# Patient Record
Sex: FEMALE | URBAN - METROPOLITAN AREA
[De-identification: names, ages, dates, MRNs, and addresses within clinical notes are randomized per-mention and may not be internally consistent; named-entity substitution may affect disease eponyms.]

---

## 2020-10-30 NOTE — IMPRESSION/PLAN
Impression: Partial CRVO, OD. Status: Symptomatic.
s/p Avastin x 9 last 08/12/2020 (consented 02/27/2020) Plan: Exam and OCT continue to reveal extrafoveal CME OD (280 microns, from 404 microns). An IVFA from 10/30/2020 demonstrated no NVE OD. Fundus Photos from 10/30/2020 demonstrated tortuous vessels. Recommend Avastin. R/B/A discussed with patient. The risks of Avastin were discussed, including off-label use and compounding pharmacy risks, and the patient elects to proceed with Avastin. After 2% Subconjunctival anesthesia & Betadine prep, 1.25mg of Avastin was injected in the eye. Cold compress and Tylenol suggested for pain if needed. Nehemias Guan Return in 7-8 weeks for OCT OU, re eval of CRVO

## 2020-12-11 NOTE — IMPRESSION/PLAN
Impression: Partial CRVO, OD. Status: Symptomatic.
s/p Avastin x 10 last 10/30/2020(consented 02/27/2020) Plan: Exam and OCT continue to reveal extrafoveal CME OD (286 microns, from 404 microns). An IVFA from 10/30/2020 demonstrated no NVE OD. Fundus Photos from 10/30/2020 demonstrated tortuous vessels. Recommend Avastin. R/B/A discussed with patient. The risks of Avastin were discussed, including off-label use and compounding pharmacy risks, and the patient elects to proceed with Avastin. After 2% Subconjunctival anesthesia & Betadine prep, 1.25mg of Avastin was injected in the eye. Cold compress and Tylenol suggested for pain if needed. Nehemias Guan Return in 7-8 weeks for OCT OU, re eval of CRVO

## 2021-01-22 NOTE — IMPRESSION/PLAN
Impression: Partial CRVO, OD. Status: Symptomatic.
s/p Avastin x 11 last 12/11/2020 (consented 02/27/2020) Plan: Exam and OCT continue to reveal extrafoveal CME OD (281 microns, from 404 microns). An IVFA from 10/30/2020 demonstrated no NVE OD. Fundus Photos from 10/30/2020 demonstrated tortuous vessels. Recommend Avastin. R/B/A discussed with patient. The risks of Avastin were discussed, including off-label use and compounding pharmacy risks, and the patient elects to proceed with Avastin. After 2% Subconjunctival anesthesia & Betadine prep, 1.25mg of Avastin was injected in the eye. Cold compress and Tylenol suggested for pain if needed. Nehemias Guan Return in 7-8 weeks for OCT OU, re eval of CRVO, possible [facility] Ginny Carlson

## 2021-03-10 NOTE — IMPRESSION/PLAN
Impression: Partial CRVO, OD. Status: Symptomatic.
s/p Avastin x 12 last 01/22/2021 (consented 02/27/2020) Plan: The patient notes worsening. Exam and OCT continue to reveal extrafoveal CME OD (273 microns, from 404 microns). An IVFA from 10/30/2020 demonstrated no NVE OD. Fundus Photos from 10/30/2020 demonstrated tortuous vessels. Recommend [facility]. RBA discussed. Patient elects [facility]. Nehemias Guan Return in 7-8 weeks for OCT OU, re eval of CRVO, possible [facility], IVFA OD 1st

## 2021-04-21 NOTE — IMPRESSION/PLAN
Impression: Partial CRVO, OD. Status: Symptomatic.
s/p Avastin x 12 last 01/22/2021 
s/p [facility] x  03/10/2021  Plan: Exam and OCT reveal extrafoveal CME OD (273 microns, from 404 microns). An IVFA from 04/21/2021 demonstrated no NVE OD. Fundus Photos from 04/21/2021 demonstrated tortuous vessels. Recommend [facility]. RBA discussed. Patient elects [facility]. Nehemias Guan Return in 7-8 weeks for OCT OU, re eval of CRVO, possible [facility],

## 2021-06-04 NOTE — IMPRESSION/PLAN
Impression: Partial CRVO, OD. Status: Symptomatic.
s/p Avastin x 12 last 01/22/2021 
s/p [facility] last 04/21/2021 Plan: Exam and OCT reveal extrafoveal CME OD (285 microns, from 404 microns). An IVFA from 04/21/2021 demonstrated no NVE OD. Fundus Photos from 04/21/2021 demonstrated tortuous vessels. Recommend [facility]. RBA discussed. Patient elects [facility]. Nehemias Guan Return in 7-8 weeks for OCT OU, re eval of CRVO, possible [facility]

## 2021-07-30 NOTE — IMPRESSION/PLAN
Impression: Partial CRVO, OD. Status: Symptomatic.
s/p Avastin x 13 last 06/04/2021
s/p [facility] last 06/04/2021  Plan: Exam and OCT reveal extrafoveal CME OD (287 microns, from 404 microns). An IVFA from 04/21/2021 demonstrated no NVE OD. Fundus Photos from 04/21/2021 demonstrated tortuous vessels. Recommend [facility]. RBA discussed. Patient elects [facility]. Nehemias Guan Return in 7-8 weeks for OCT OU, re eval of CRVO, possible [facility]

## 2021-09-17 NOTE — IMPRESSION/PLAN
Impression: Partial CRVO, OD. Status: Symptomatic.
s/p Avastin x 13 last 06/04/2021
s/p [facility] last 07/30/2021 Plan: Exam and OCT reveal extrafoveal CME OD (282 microns, from 404 microns). An IVFA from 04/21/2021 demonstrated no NVE OD. Fundus Photos from 04/21/2021 demonstrated tortuous vessels. Recommend [facility]. RBA discussed. Patient elects [facility]. Will consider Avastin. Nehemias Guan Return in 7-8 weeks for OCT OU, re eval of CRVO, possible Avastin Cora Reusing)

## 2021-11-12 NOTE — IMPRESSION/PLAN
Impression: Partial CRVO, OD. Status: Symptomatic.
s/p Avastin x 13 last 06/04/2021
s/p [facility] last 09/17/2021 Plan: Exam and OCT reveal extrafoveal CME OD (304 microns, from 404 microns). An IVFA from 04/21/2021 demonstrated no NVE OD. Fundus Photos from 04/21/2021 demonstrated tortuous vessels. Recommend switch to Avastin. RBA discussed. Patient elects proceed. Intravitreal Avastin injected OD today without complication. Thanks, Nehemias Return in 7-8 weeks for OCT OU, re eval of CRVO, possible Avastin Dora Guzman

## 2022-02-16 NOTE — IMPRESSION/PLAN
Impression: Partial CRVO, OD. Status: Symptomatic.
s/p Avastin x 14 last 11/12/2021
s/p [facility] last 07/30/2021 Plan: Exam and OCT reveal extrafoveal CME OD (297 microns, from 404 microns). An IVFA from 04/21/2021 demonstrated no NVE OD. Fundus Photos from 04/21/2021 demonstrated tortuous vessels. Recommend Avastin. R/B/A discussed with patient. The risks of Avastin were discussed, including off-label use and compounding pharmacy risks, and the patient elects to proceed with Avastin. After 2% Subconjunctival anesthesia & betadine prep, 1.25mg of Avastin was injected in the eye. Cold compress and Tylenol suggested for pain if needed. 

Return in 7-8 weeks for OCT OU, re eval of CRVO, possible Avastin, IVFAS OD 1st

## 2022-04-13 NOTE — IMPRESSION/PLAN
Impression: Partial CRVO, OD. Status: Symptomatic.
s/p Avastin x 15 last 02/16/2022
s/p [facility] last 07/30/2021 Plan: Exam and OCT reveal extrafoveal CME OD (308 microns, from 404 microns). An IVFA from 04/13/2022 demonstrated no NVE OD. Fundus Photos from 04/13/2022 demonstrated tortuous vessels. Recommend Avastin. R/B/A discussed with patient. The risks of Avastin were discussed, including off-label use and compounding pharmacy risks, and the patient elects to proceed with Avastin. After 2% Subconjunctival anesthesia & betadine prep, 1.25mg of Avastin was injected in the eye. Cold compress and Tylenol suggested for pain if needed. 

Return in 7-8 weeks for OCT OU, re eval of CRVO, possible Avastin

## 2022-06-01 NOTE — IMPRESSION/PLAN
Impression: Partial CRVO, OD. Status: Symptomatic.
s/p Avastin x 16 last 04/13/2022
s/p [facility] last 07/30/2021 Plan: Exam and OCT reveal extrafoveal CME OD (300 microns, from 404 microns). An IVFA from 04/13/2022 demonstrated no NVE OD. Fundus Photos from 04/13/2022 demonstrated tortuous vessels. Recommend Avastin. R/B/A discussed with patient. The risks of Avastin were discussed, including off-label use and compounding pharmacy risks, and the patient elects to proceed with Avastin. After 2% Subconjunctival anesthesia & betadine prep, 1.25mg of Avastin was injected in the eye. Cold compress and Tylenol suggested for pain if needed. 

Return in 7-8 weeks for OCT OU, re eval of CRVO, possible Avastin

## 2022-07-27 NOTE — IMPRESSION/PLAN
Impression: Partial CRVO, OD. Status: Symptomatic.
s/p Avastin x 17 last 06/01/2022
s/p [facility] last 07/30/2021 Plan: Exam and OCT reveal extrafoveal CME OD (383 microns, from 404 microns). An IVFA from 04/13/2022 demonstrated no NVE OD. Fundus Photos from 04/13/2022 demonstrated tortuous vessels. Recommend Avastin. R/B/A discussed with patient. The risks of Avastin were discussed, including off-label use and compounding pharmacy risks, and the patient elects to proceed with Avastin. After 2% Subconjunctival anesthesia & betadine prep, 1.25mg of Avastin was injected in the eye. Cold compress and Tylenol suggested for pain if needed. 

Return in 7-8 weeks for OCT OU, re eval of CRVO, possible Avastin

## 2022-10-20 NOTE — IMPRESSION/PLAN
Impression: Partial CRVO, OD. Status: Symptomatic.
s/p Avastin x 18  last 07/27/2022
s/p [facility] last 07/30/2021 Plan: Exam and OCT reveal extrafoveal CME OD (291 microns, from 404 microns). An IVFA from 04/13/2022 demonstrated no NVE OD. Fundus Photos from 04/13/2022 demonstrated tortuous vessels. Recommend Avastin. R/B/A discussed with patient. The risks of Avastin were discussed, including off-label use and compounding pharmacy risks, and the patient elects to proceed with Avastin. After 2% Subconjunctival anesthesia & betadine prep, 1.25mg of Avastin was injected in the eye. Cold compress and Tylenol suggested for pain if needed. 

Return in 7-8 weeks for OCT OU, re eval of CRVO, possible Avastin, IVFA OD 1st

## 2023-01-06 ENCOUNTER — OFFICE VISIT (OUTPATIENT)
Dept: URBAN - METROPOLITAN AREA CLINIC 27 | Facility: CLINIC | Age: 68
End: 2023-01-06
Payer: COMMERCIAL

## 2023-01-06 DIAGNOSIS — H04.123 DRY EYE SYNDROME OF BILATERAL LACRIMAL GLANDS: ICD-10-CM

## 2023-01-06 DIAGNOSIS — H25.13 AGE-RELATED NUCLEAR CATARACT, BILATERAL: ICD-10-CM

## 2023-01-06 DIAGNOSIS — H43.813 VITREOUS DEGENERATION, BILATERAL: ICD-10-CM

## 2023-01-06 DIAGNOSIS — H47.091 OTHER DISORDER OF OPTIC NERVE OF RIGHT EYE: ICD-10-CM

## 2023-01-06 DIAGNOSIS — H34.8110 CENTRAL RETINAL VEIN OCCLS, RIGHT EYE, WITH MACULAR EDEMA: Primary | ICD-10-CM

## 2023-01-06 DIAGNOSIS — H35.81 RETINAL EDEMA: ICD-10-CM

## 2023-01-06 PROCEDURE — 67028 INJECTION EYE DRUG: CPT | Performed by: OPHTHALMOLOGY

## 2023-01-06 PROCEDURE — 92014 COMPRE OPH EXAM EST PT 1/>: CPT | Performed by: OPHTHALMOLOGY

## 2023-01-06 PROCEDURE — 92134 CPTRZ OPH DX IMG PST SGM RTA: CPT | Performed by: OPHTHALMOLOGY

## 2023-01-06 PROCEDURE — 92235 FLUORESCEIN ANGRPH MLTIFRAME: CPT | Performed by: OPHTHALMOLOGY

## 2023-01-06 ASSESSMENT — INTRAOCULAR PRESSURE
OD: 16
OS: 11

## 2023-01-06 NOTE — IMPRESSION/PLAN
Impression: Partial CRVO, OD. Status: Symptomatic.
s/p Avastin x 19  last 10/20/2022 
s/p [facility] last 07/30/2021 Plan: Exam and OCT reveal extrafoveal CME OD (401 microns, from 291 microns, from 404 microns). An IVFA from 01/06/2023 demonstrated no NVE OD. Fundus Photos from 01/06/2023 demonstrated tortuous vessels. Recommend Avastin. R/B/A discussed with patient. The risks of Avastin were discussed, including off-label use and compounding pharmacy risks, and the patient elects to proceed with Avastin. After 2% Subconjunctival anesthesia & betadine prep, 1.25mg of Avastin was injected in the eye. Cold compress and Tylenol suggested for pain if needed. 

Return in 5-6 weeks for OCT OU, re eval of CRVO, possible Avastin

## 2023-02-22 NOTE — IMPRESSION/PLAN
Impression: Partial CRVO, OD. Status: Symptomatic.
s/p Avastin x 20  last 01/06/2023
s/p [facility] last 07/30/2021 Plan: Exam and OCT reveal extrafoveal CME OD (362 microns, from 404 microns). An IVFA from 01/06/2023 demonstrated no NVE OD. Fundus Photos from 01/06/2023 demonstrated tortuous vessels. Recommend Avastin. R/B/A discussed with patient. The risks of Avastin were discussed, including off-label use and compounding pharmacy risks, and the patient elects to proceed with Avastin. After 2% Subconjunctival anesthesia & betadine prep, 1.25mg of Avastin was injected in the eye. Cold compress and Tylenol suggested for pain if needed. 

Return in 5-6 weeks for OCT OU, re eval of CRVO, possible Avastin

## 2023-04-19 NOTE — IMPRESSION/PLAN
Impression: Partial CRVO, OD. Status: Symptomatic.
s/p Avastin x 21  last 02/22/2023 
s/p [facility] last 07/30/2021 Plan: Exam and OCT reveal extrafoveal CME OD (276 microns, from 404 microns). An IVFA from 01/06/2023 demonstrated no NVE OD. Fundus Photos from 01/06/2023 demonstrated tortuous vessels. Recommend Avastin. R/B/A discussed with patient. The risks of Avastin were discussed, including off-label use and compounding pharmacy risks, and the patient elects to proceed with Avastin. After 2% Subconjunctival anesthesia & betadine prep, 1.25mg of Avastin was injected in the eye. Cold compress and Tylenol suggested for pain if needed. Carotenoids Return in 6 weeks for OCT OU, re eval of CRVO, possible Avastin

## 2023-05-17 NOTE — IMPRESSION/PLAN
Impression: Partial CRVO, OD. Status: Symptomatic.
s/p Avastin x 22  last 04/19/2023
s/p [facility] last 07/30/2021 Plan: Exam and OCT reveal extrafoveal CME OD (359 microns, from 404 microns). An IVFA from 01/06/2023 demonstrated no NVE OD. Fundus Photos from 01/06/2023 demonstrated tortuous vessels. Recommend Avastin. R/B/A discussed with patient. The risks of Avastin were discussed, including off-label use and compounding pharmacy risks, and the patient elects to proceed with Avastin. After 2% Subconjunctival anesthesia & betadine prep, 1.25mg of Avastin was injected in the eye. Cold compress and Tylenol suggested for pain if needed. Carotenoids Return in 6 weeks for OCT OU, re eval of CRVO, possible Avastin

## 2023-07-17 NOTE — IMPRESSION/PLAN
Impression: CRVO with CME OD Plan: Exam/OCT show CRVO w/o recurrent CME OD. An IVFA from 01/06/2023 demonstrated no NVE OD. Fundus Photos from 01/06/2023 demonstrated tortuous vessels. Recommend Avastin OD today. Attempt T+E. Submit [place] BI.  

8 weeks, OCT/[place] OD (no dilation)

## 2024-05-13 ENCOUNTER — OFFICE VISIT (OUTPATIENT)
Dept: URBAN - METROPOLITAN AREA CLINIC 27 | Facility: CLINIC | Age: 69
End: 2024-05-13
Payer: COMMERCIAL

## 2024-05-13 DIAGNOSIS — H34.8110 CENTRAL RETINAL VEIN OCCLS, RIGHT EYE, WITH MACULAR EDEMA: Primary | ICD-10-CM

## 2024-05-13 DIAGNOSIS — H43.813 VITREOUS DEGENERATION, BILATERAL: ICD-10-CM

## 2024-05-13 DIAGNOSIS — H25.13 AGE-RELATED NUCLEAR CATARACT, BILATERAL: ICD-10-CM

## 2024-05-13 DIAGNOSIS — H04.123 DRY EYE SYNDROME OF BILATERAL LACRIMAL GLANDS: ICD-10-CM

## 2024-05-13 PROCEDURE — 67028 INJECTION EYE DRUG: CPT | Performed by: OPHTHALMOLOGY

## 2024-05-13 PROCEDURE — 99214 OFFICE O/P EST MOD 30 MIN: CPT | Performed by: OPHTHALMOLOGY

## 2024-05-13 PROCEDURE — 92134 CPTRZ OPH DX IMG PST SGM RTA: CPT | Performed by: OPHTHALMOLOGY

## 2024-05-13 ASSESSMENT — INTRAOCULAR PRESSURE
OD: 17
OS: 17